# Patient Record
Sex: MALE | Race: WHITE | NOT HISPANIC OR LATINO | Employment: UNEMPLOYED | URBAN - METROPOLITAN AREA
[De-identification: names, ages, dates, MRNs, and addresses within clinical notes are randomized per-mention and may not be internally consistent; named-entity substitution may affect disease eponyms.]

---

## 2017-01-10 ENCOUNTER — GENERIC CONVERSION - ENCOUNTER (OUTPATIENT)
Dept: OTHER | Facility: OTHER | Age: 52
End: 2017-01-10

## 2017-07-19 ENCOUNTER — ALLSCRIPTS OFFICE VISIT (OUTPATIENT)
Dept: OTHER | Facility: OTHER | Age: 52
End: 2017-07-19

## 2017-07-19 DIAGNOSIS — F52.21 MALE ERECTILE DISORDER (CODE): ICD-10-CM

## 2017-07-19 DIAGNOSIS — N40.1 ENLARGED PROSTATE WITH LOWER URINARY TRACT SYMPTOMS (LUTS): ICD-10-CM

## 2017-07-19 LAB
CLARITY UR: NORMAL
COLOR UR: CLEAR
GLUCOSE (HISTORICAL): NORMAL
HGB UR QL STRIP.AUTO: NORMAL
KETONES UR STRIP-MCNC: NORMAL MG/DL
LEUKOCYTE ESTERASE UR QL STRIP: NORMAL
NITRITE UR QL STRIP: NORMAL
PH UR STRIP.AUTO: 5 [PH]
PROT UR STRIP-MCNC: NORMAL MG/DL
SP GR UR STRIP.AUTO: 1

## 2017-09-05 ENCOUNTER — GENERIC CONVERSION - ENCOUNTER (OUTPATIENT)
Dept: OTHER | Facility: OTHER | Age: 52
End: 2017-09-05

## 2017-12-04 ENCOUNTER — ALLSCRIPTS OFFICE VISIT (OUTPATIENT)
Dept: OTHER | Facility: OTHER | Age: 52
End: 2017-12-04

## 2017-12-05 ENCOUNTER — APPOINTMENT (OUTPATIENT)
Dept: LAB | Facility: HOSPITAL | Age: 52
End: 2017-12-05
Payer: COMMERCIAL

## 2017-12-05 DIAGNOSIS — F52.21 MALE ERECTILE DISORDER (CODE): ICD-10-CM

## 2017-12-05 DIAGNOSIS — D12.6 BENIGN NEOPLASM OF COLON: ICD-10-CM

## 2017-12-05 DIAGNOSIS — J30.9 ALLERGIC RHINITIS: ICD-10-CM

## 2017-12-05 DIAGNOSIS — Z13.220 ENCOUNTER FOR SCREENING FOR LIPOID DISORDERS: ICD-10-CM

## 2017-12-05 DIAGNOSIS — H65.90 NONSUPPURATIVE OTITIS MEDIA: ICD-10-CM

## 2017-12-05 DIAGNOSIS — N40.1 ENLARGED PROSTATE WITH LOWER URINARY TRACT SYMPTOMS (LUTS): ICD-10-CM

## 2017-12-05 DIAGNOSIS — H69.80 OTHER SPECIFIED DISORDERS OF EUSTACHIAN TUBE, UNSPECIFIED EAR: ICD-10-CM

## 2017-12-05 DIAGNOSIS — R03.0 ELEVATED BLOOD PRESSURE READING WITHOUT DIAGNOSIS OF HYPERTENSION: ICD-10-CM

## 2017-12-05 DIAGNOSIS — E66.9 OBESITY: ICD-10-CM

## 2017-12-05 LAB
ALBUMIN SERPL BCP-MCNC: 4 G/DL (ref 3.5–5)
ALP SERPL-CCNC: 75 U/L (ref 46–116)
ALT SERPL W P-5'-P-CCNC: 37 U/L (ref 12–78)
ANION GAP SERPL CALCULATED.3IONS-SCNC: 6 MMOL/L (ref 4–13)
AST SERPL W P-5'-P-CCNC: 16 U/L (ref 5–45)
BILIRUB SERPL-MCNC: 0.66 MG/DL (ref 0.2–1)
BILIRUB UR QL STRIP: NEGATIVE
BUN SERPL-MCNC: 18 MG/DL (ref 5–25)
CALCIUM SERPL-MCNC: 9.3 MG/DL (ref 8.3–10.1)
CHLORIDE SERPL-SCNC: 101 MMOL/L (ref 100–108)
CHOLEST SERPL-MCNC: 168 MG/DL (ref 50–200)
CLARITY UR: CLEAR
CO2 SERPL-SCNC: 29 MMOL/L (ref 21–32)
COLOR UR: YELLOW
CREAT SERPL-MCNC: 1.06 MG/DL (ref 0.6–1.3)
ERYTHROCYTE [DISTWIDTH] IN BLOOD BY AUTOMATED COUNT: 13.4 % (ref 11.6–15.1)
GFR SERPL CREATININE-BSD FRML MDRD: 81 ML/MIN/1.73SQ M
GLUCOSE P FAST SERPL-MCNC: 98 MG/DL (ref 65–99)
GLUCOSE UR STRIP-MCNC: NEGATIVE MG/DL
HCT VFR BLD AUTO: 44.2 % (ref 36.5–49.3)
HDLC SERPL-MCNC: 66 MG/DL (ref 40–60)
HGB BLD-MCNC: 15.4 G/DL (ref 12–17)
HGB UR QL STRIP.AUTO: NEGATIVE
KETONES UR STRIP-MCNC: NEGATIVE MG/DL
LDLC SERPL CALC-MCNC: 79 MG/DL (ref 0–100)
LEUKOCYTE ESTERASE UR QL STRIP: NEGATIVE
MCH RBC QN AUTO: 32.6 PG (ref 26.8–34.3)
MCHC RBC AUTO-ENTMCNC: 34.8 G/DL (ref 31.4–37.4)
MCV RBC AUTO: 94 FL (ref 82–98)
NITRITE UR QL STRIP: NEGATIVE
PH UR STRIP.AUTO: 6 [PH] (ref 4.5–8)
PLATELET # BLD AUTO: 191 THOUSANDS/UL (ref 149–390)
PMV BLD AUTO: 12 FL (ref 8.9–12.7)
POTASSIUM SERPL-SCNC: 4.2 MMOL/L (ref 3.5–5.3)
PROT SERPL-MCNC: 8 G/DL (ref 6.4–8.2)
PROT UR STRIP-MCNC: NEGATIVE MG/DL
PSA SERPL-MCNC: 0.3 NG/ML (ref 0–4)
RBC # BLD AUTO: 4.72 MILLION/UL (ref 3.88–5.62)
SODIUM SERPL-SCNC: 136 MMOL/L (ref 136–145)
SP GR UR STRIP.AUTO: 1.02 (ref 1–1.03)
TRIGL SERPL-MCNC: 114 MG/DL
UROBILINOGEN UR QL STRIP.AUTO: 0.2 E.U./DL
WBC # BLD AUTO: 6.49 THOUSAND/UL (ref 4.31–10.16)

## 2017-12-05 PROCEDURE — 85027 COMPLETE CBC AUTOMATED: CPT

## 2017-12-05 PROCEDURE — 80053 COMPREHEN METABOLIC PANEL: CPT

## 2017-12-05 PROCEDURE — 36415 COLL VENOUS BLD VENIPUNCTURE: CPT

## 2017-12-05 PROCEDURE — 84153 ASSAY OF PSA TOTAL: CPT

## 2017-12-05 PROCEDURE — 80061 LIPID PANEL: CPT

## 2017-12-05 PROCEDURE — 81003 URINALYSIS AUTO W/O SCOPE: CPT

## 2017-12-05 NOTE — PROGRESS NOTES
Assessment    1  Allergic rhinitis (477 9) (J30 9)   2  Eustachian tube dysfunction (381 81) (H69 80)   3  Serous otitis media (381 4) (H65 90)   4  BPH with obstruction/lower urinary tract symptoms (600 01,599 69) (N40 1,N13 8)   5  Elevated blood pressure reading (796 2) (R03 0)   6  Adenomatosis (211 3) (D12 6)   · Dr Bebe Mora, 12/12/13  Recommended colonoscopy in 10 years   7  Obesity (278 00) (E66 9)   8  Screening for lipid disorders (V77 91) (Z13 220)   9  Erectile dysfunction of non-organic origin (302 72) (F52 21)    Plan  Adenomatosis, Allergic rhinitis, BPH with obstruction/lower urinary tract symptoms,Elevated blood pressure reading, Erectile dysfunction of non-organic origin, Eustachiantube dysfunction, Obesity, Screening for lipid disorders, Serous otitis media    · Begin or continue regular aerobic exercise  Gradually work up to at least {count1}sessions of {dur1} of exercise a week ; Status:Complete;   Done: 21RQS3835 02:09PM   · Continue with our present treatment plan ; Status:Complete;   Done: 52Jrp461232:09PM   · We recommend that you bring your body mass index down to 26 ; Status:Complete;  Done: 59GXW3355 02:09PM   · Follow-up visit in 2 weeks Evaluation and Treatment  Follow-up  Status: Hold For -Scheduling  Requested for: 95FGA0296   · (1) CBC/ PLT (NO DIFF); Status:Active; Requested for:38Pwd5354;    · (1) COMPREHENSIVE METABOLIC PANEL; Status:Active; Requested for:61Jvb2739;    · (1) LIPID PANEL, FASTING; Status:Active; Requested for:69Dst2684;    · (1) OCCULT BLOOD, FECAL IMMUNOCHEMICAL TEST; Status:Active; Requestedfor:22Hpx9941;    · (1) PSA, DIAGNOSTIC (FOLLOW-UP); Status:Active; Requested for:86Hgt9250;    · (1) URINALYSIS (will reflex a microscopy if leukocytes, occult blood, protein or nitritesare not within normal limits); Status:Active; Requested for:66Ifu5309;    Allergic rhinitis, Eustachian tube dysfunction, Serous otitis media    · Azelastine HCl - 0 1 % Nasal Solution; USE 2 SPRAYS IN EACH       NOSTRILTWICE DAILY    Discussion/Summary    1  Allergic rhinitis/etd/serous otitis media, Astelin nasal spray was prescribedColon polyp status post colonoscopy 2013 per Gastroenterology repeat in 10 years, Hemoccult was orderedBPH/nocturia/ED, patient to follow up with Urology, PSA is orderedBorderline blood pressure recheck in 2 weeksObesity patient gained 3 lb diet exercise and weight loss recommendedHealth maintenance will check full blood work lipid panelRTO 2 weeks  Possible side effects of new medications were reviewed with the patient/guardian today  The treatment plan was reviewed with the patient/guardian  The patient/guardian understands and agrees with the treatment plan     Self Referrals: No      Chief Complaint  patient c/o blocked ears left more than right, some pain, nasal congestion x 1-2 weeks  Patient is not taking any NSAIDS, etc  ak      History of Present Illness  HPI: History as above  Years just feel âstrangeâ negative otalgia or otorrhea mild head congestion no fever chills  Review of Systems   Constitutional: as noted in HPI   ENT: as noted in HPI  Cardiovascular: no complaints of slow or fast heart rate, no chest pain, no palpitations, no leg claudication or lower extremity edema  Respiratory: no complaints of shortness of breath, no wheezing or cough, no dyspnea on exertion, no orthopnea or PND  Gastrointestinal: no complaints of abdominal pain, no constipation, no nausea or vomiting, no diarrhea or bloody stools  Genitourinary: no complaints of dysuria or incontinence, no hesitancy, no nocturia, no genital lesion, no inadequacy of penile erection  Musculoskeletal: no complaints of arthralgia, no myalgia, no joint swelling or stiffness, no limb pain or swelling  Integumentary: no complaints of skin rash or lesion, no itching or dry skin, no skin wounds    Neurological: no complaints of headache, no confusion, no numbness or tingling, no dizziness or fainting  Active Problems  1  Adenomatosis (211 3) (D12 6)   2  Allergic rhinitis (477 9) (J30 9)   3  BPH with obstruction/lower urinary tract symptoms (600 01,599 69) (N40 1,N13 8)   4  Elevated blood pressure reading (796 2) (R03 0)   5  Erectile dysfunction of non-organic origin (302 72) (F52 21)   6  External hemorrhoid (455 3) (K64 4)   7  Nocturia (788 43) (R35 1)   8  Obesity (278 00) (E66 9)   9  Organic impotence (607 84) (N52 9)   10  Screening for lipid disorders (V77 91) (Z13 220)   11  Sleep apnea (780 57) (G47 30)   12  Stress (V62 89) (F43 9)   13  Testicular asymmetry (752 89) (Q55 9)    Past Medical History  1  History of backache (V13 59) (Z87 39)  Active Problems And Past Medical History Reviewed: The active problems and past medical history were reviewed and updated today  Family History  Mother    1  Family history of Emphysema   2  Family history of Family Health Status Of Mother -   Father    3  Family history of Family Health Status Of Father - Alive  Family History Reviewed: The family history was reviewed and updated today  Social History   · Being A Social Drinker   · Does not use illicit drugs (F30 22) (Z78 9)   ·    · Never a smoker   · No secondhand smoke exposure (V49 89) (Z78 9)  The social history was reviewed and updated today  Surgical History    1  History of Complete Colonoscopy  Surgical History Reviewed: The surgical history was reviewed and updated today  Current Meds   1  Sildenafil Citrate 20 MG Oral Tablet; TAKE 2 TABLET Daily PRN; Therapy: 57SRL6587 to (Evaluate:01Ojy2487)  Requested for: 24MRS1544; Last Rx:87Hht1230 Ordered    The medication list was reviewed and updated today  Allergies  1   No Known Drug Allergies    Vitals   Recorded: 37SKD9953 01:58PM   Temperature 98 3 F   Heart Rate 76   Systolic 669   Diastolic 82   Weight 593 lb    BMI Calculated 33 15   BSA Calculated 2 22       Physical Exam   Constitutional General appearance: No acute distress, well appearing and well nourished  Eyes  Conjunctiva and lids: No swelling, erythema, or discharge  Ears, Nose, Mouth, and Throat  External inspection of ears and nose: Normal    Otoscopic examination: Abnormal  -- Both tympanic membranes dull with fluid negative injection  Nasal mucosa, septum, and turbinates: Abnormal  -- Positive allergic turbinates  Oropharynx: Normal with no erythema, edema, exudate or lesions  Pulmonary  Respiratory effort: No increased work of breathing or signs of respiratory distress  Auscultation of lungs: Clear to auscultation, equal breath sounds bilaterally, no wheezes, no rales, no rhonci  Cardiovascular  Palpation of heart: Normal PMI, no thrills  Auscultation of heart: Normal rate and rhythm, normal S1 and S2, without murmurs  Examination of extremities for edema and/or varicosities: Normal    Carotid pulses: Normal    Abdomen Soft nontender  Lymphatic  Palpation of lymph nodes in neck: No lymphadenopathy  Musculoskeletal  Gait and station: Normal    Skin Warm and dry  Neurologic Negative gross focal motor deficit  Psychiatric  Mood and affect: Normal          Future Appointments    Date/Time Provider Specialty Site   07/18/2018 02:00 PM KAI Healy   Urology [facility]       Signatures   Electronically signed by : Debby Hernandez DO; Dec  4 2017  2:11PM EST                       (Author)

## 2017-12-07 ENCOUNTER — GENERIC CONVERSION - ENCOUNTER (OUTPATIENT)
Dept: OTHER | Facility: OTHER | Age: 52
End: 2017-12-07

## 2018-01-10 NOTE — RESULT NOTES
Message   Recorded as Task   Date: 04/08/2016 11:32 AM, Created By: Robert Molina   Task Name: Call Patient with results   Assigned To: Warren Ervin   Regarding Patient: Dedrick Fang, Status:  In Progress   Comment:    Warren Ervin - 08 Apr 2016 11:32 AM     Patient Phone: [phone]    chest x-ray is clear   Aixa Wilalrd - 08 Apr 2016 12:00 PM     TASK IN PROGRESS   Aixa Willard - 08 Apr 2016 12:07 PM     TASK EDITED  [facility] H# informing pt of CXR results     Signatures   Electronically signed by : Barbara Renee, ; Apr 8 2016 12:07PM EST                       (Author)

## 2018-01-10 NOTE — RESULT NOTES
Verified Results  * US ABDOMEN COMPLETE 53VDV4427 02:02PM Cecily Hernandez Order Number: [id number]     Test Name Result Flag Reference   US ABDOMEN COMPLETE (Report)     ABDOMEN ULTRASOUND, COMPLETE     INDICATION: Upper abdominal pain  COMPARISON: 01/10/2014     TECHNIQUE:  Real-time ultrasound of the abdomen was performed with a curvilinear transducer with both volumetric sweeps and still imaging techniques  FINDINGS:     PANCREAS: Visualized portions of the pancreas are within normal limits  Somewhat obscured by overlying bowel gas  AORTA AND IVC: Visualized portions are normal for patient age  LIVER:   Size: Within normal range  The liver measures 15 7 cm in the midclavicular line  Contour: Surface contour is smooth  Parenchyma: Echogenicity and echotexture are within normal limits  No evidence of suspicious mass  The main portal vein is patent and hepatopetal       BILIARY:   The gallbladder is normal in caliber  No wall thickening or pericholecystic fluid  No stones or sludge identified  Sonographic Dierdre Gerold sign is negative  No intrahepatic biliary dilatation  CBD measures 3 mm  No choledocholithiasis  KIDNEY:    Right kidney measures 11 9 x 4 5 cm  The renal cortex measures 1 3 cm  Within normal limits  Left kidney measures 12 7 x 6 7 cm  The renal cortex measures 1 7 cm  Within normal limits  SPLEEN:    Measures 12 6 x 12 3 x 5 2 cm  Within normal limits  ASCITES: None         IMPRESSION:     Normal        Workstation performed: [id number]     Signed by:   Dana Alcala MD   4/8/16

## 2018-01-11 NOTE — RESULT NOTES
Verified Results  ECHO STRESS TEST W CONTRAST IF INDICATED 2016 07:49AM Warren Ervin     Test Name Result Flag Reference   ECHO STRESS TEST W CONTRAST IF INDICATED (Report)     [address]   [facility], [address] [facility]   [phone]     Exercise Stress Echocardiography     Study date: 2016     Patient: Aniceto Red   MR number: [id number]   Account number: [de-identified]   : 1965   Age: 54 years   Gender: Male   Study date: 2016   Status: Outpatient   Location: Stress lab   Height: 70 in   Weight: 236 lb   BP: 138// 86 mmHg     Indications: Detection of coronary artery disease  Diagnosis: R07 9 - Chest pain, unspecified     Sonographer: Paul Villa [facility] AE-PE   Primary Physician: Roe Loza DO   Referring Physician: Roe Loza DO   Group: [id number]   Cardiology Fellow: Ashley Mariee MD   RN: Javier Paris RN BSN   Report Prepared By: Jorge Gupta   EKG Technician: Jorge Gupta   Interpreting Physician: Peter Roberson MD     IMPRESSIONS:   Normal study after maximal exercise without reproduction of symptoms  Left   ventricular systolic function was normal      SUMMARY     STRESS RESULTS:   Duration of exercise was 12 min and 2 sec  Maximal work rate was 13 7 METs  Maximal heart rate during stress was 153 bpm ( 90 % of maximal predicted heart   rate)  Target heart rate was achieved  There was resting hypertension with an appropriate blood pressure response to   stress  There was no chest pain during stress  ECG CONCLUSIONS:   The stress ECG was negative for ischemia  ECHO CONCLUSIONS:   There was no echocardiographic evidence for stress-induced ischemia  HISTORY: The patient is a 54year old  male  Chest pain status: no   chest pain  Other symptoms: decreased effort tolerance, and fatigue  Coronary   artery disease risk factors: none   Co-morbidity: obesity  REST ECG: Normal sinus rhythm  PROCEDURE: The study was performed in the stress lab  Treadmill exercise   testing was performed, using the Latrice protocol  Stress and rest   echocardiographic evaluation was performed from multiple acoustic windows for   evaluation of ventricular function  LATRICE PROTOCOL:   HR bpm SBP mmHg DBP mmHg Symptoms   Baseline 73 138 86 none   Stage 1 104 144 80 none   Stage 2 121 152 80 none   Stage 3 133 168 80 none   Stage 4 153 -- -- mild fatigue   Immediate 153 -- -- mild fatigue   Recovery 1 109 180 78 mild fatigue   Recovery 2 96 158 82 subsiding     MEDICATIONS GIVEN: No medications or fluids given  STRESS RESULTS: Duration of exercise was 12 min and 2 sec  The patient   exercised to protocol stage 4  Maximal work rate was 13 7 METs  Maximal heart   rate during stress was 153 bpm ( 90 % of maximal predicted heart rate)  Target   heart rate was achieved  The heart rate response to stress was normal  Maximal   systolic blood pressure during stress was 180 mmHg  There was resting   hypertension with an appropriate blood pressure response to stress  There was   no chest pain during stress  The stress test was terminated due to mild fatigue  ECG CONCLUSIONS: The stress ECG was negative for ischemia  Arrhythmia during   stress: isolated atrial premature beats  STRESS 2D ECHO RESULTS:     BASELINE: Left ventricular size was normal  Overall left ventricular systolic   function was normal      PEAK STRESS: There was an appropriate reduction in left ventricular size  There   was an appropriate augmentation in LV function  OTHER ECHO FINDINGS: There was trace mitral, tricuspid and aortic regurgitation   noted on the baseline echocardiogram      ECHO CONCLUSIONS: There was no echocardiographic evidence for stress-induced   ischemia  The image quality was fair       Prepared and electronically signed by     Lauren Sheldon MD   Signed 19-Apr-2016 10:38:19

## 2018-01-13 VITALS
SYSTOLIC BLOOD PRESSURE: 144 MMHG | HEART RATE: 68 BPM | DIASTOLIC BLOOD PRESSURE: 102 MMHG | WEIGHT: 230.38 LBS | BODY MASS INDEX: 32.98 KG/M2 | HEIGHT: 70 IN

## 2018-01-13 NOTE — RESULT NOTES
Verified Results  [address]  27HYR8701 07:37AM Francenia Sallies Sherra Boast Order Number: [id number]     Test Name Result Flag Reference   US SCROTUM AND TESTICLES (Report)     SCROTAL ULTRASOUND     INDICATION: 53-year-old with left testicular swelling and right inguinal discomfort  COMPARISON: CT abdomen and pelvis 7/16/2013  TECHNIQUE:  Ultrasound the scrotal contents was performed with a high frequency linear transducer utilizing volumetric sweep imaging as well as standard still image techniques  Imaging performed in longitudinal and transverse orientation  Color and    spectral Doppler evaluation also performed bilaterally  FINDINGS:     TESTES:    Testes are symmetric and normal in size  RIGHT testis = 4 6 x 2 1 x 3 3 cm    Normal contour with homogeneous smooth echotexture  No intratesticular mass lesion or calcifications  LEFT testis = 3 8 x 2 4 x 3 8 cm   Normal contour with homogeneous smooth echotexture  No intratesticular mass lesion or calcifications  Doppler flow within both testes is present and symmetric  EPIDIDYMIDES:    Normal Size  Doppler ultrasound demonstrates normal blood flow  There are small epididymal cyst(s) in the left epididymis  Otherwise unremarkable  Right epididymal appendage is noted  HYDROCELE: No significant fluid present  VARICOCELE: Small right varicocele is present  SCROTUM: Scrotal thickness and appearance within normal limits  No evidence for extratesticular mass or hernia demonstrated  IMPRESSION:        1  Normal testes  2  Small right varicocele     3  Small left epididymal cyst         Workstation performed: [id number]     Signed by:   Poppy Seaman MD   5/6/16

## 2018-01-17 ENCOUNTER — ALLSCRIPTS OFFICE VISIT (OUTPATIENT)
Dept: OTHER | Facility: OTHER | Age: 53
End: 2018-01-17

## 2018-01-18 NOTE — RESULT NOTES
Verified Results  * XR CHEST PA & LATERAL 07Apr2016 02:07PM Carolyn Lares Order Number: [id number]     Test Name Result Flag Reference   XR CHEST PA & LATERAL (Report)     CHEST      INDICATION: Physical exam     COMPARISON: January 10, 2014     VIEWS: Frontal and lateral projections; 2 images     FINDINGS:        Cardiomediastinal silhouette appears unremarkable  The lungs are clear  No pneumothorax or pleural effusion  Osseous structures are stable  There is again noted slight loss of anterior vertebral body height at the T12 T11 T10 levels stable       IMPRESSION:     No active pulmonary disease         Workstation performed: [id number]     Signed by:   Yulia Leung MD   4/8/16

## 2018-01-18 NOTE — PROGRESS NOTES
Assessment    1  Chest pain (786 50) (R07 9)   2  Abdominal pain, epigastric (789 06) (R10 13)   3  Iron deficiency anemia (280 9) (D50 9)   · Status post EGD and colonoscopy Dr Brisa Titus 12/12/13   4  Erectile dysfunction of non-organic origin (302 72) (F52 21)   5  BPH with obstruction/lower urinary tract symptoms (600 01,599 69) (N40 1,N13 8)   6  Obesity (278 00) (E66 9)   7  Allergic rhinitis (477 9) (J30 9)   8  Encounter for preventive health examination (V70 0) (Z00 00)   9  Adenomatosis (211 3) (D12 6)   · Dr Sophia Ramos, 12/12/13  Recommended colonoscopy in 10 years    Plan  Abdominal pain, epigastric, Adenomatosis, Allergic rhinitis, BPH with obstruction/lower  urinary tract symptoms, Chest pain, Health Maintenance, Erectile dysfunction of  non-organic origin, Iron deficiency anemia, Obesity    · Continue with our present treatment plan ; Status:Complete;   Done: 70GLO3760   · Follow-up visit in 3 weeks Evaluation and Treatment  Follow-up  Status: Hold For -  Scheduling  Requested for: 31MYX6164   · (1) AMYLASE; Status:Active; Requested for:04Apr2016;    · (1) CBC/ PLT (NO DIFF); Status:Active; Requested for:04Apr2016;    · (1) COMPREHENSIVE METABOLIC PANEL; Status:Active; Requested for:04Apr2016;    · (1) LIPASE; Status:Active; Requested for:04Apr2016;    · (1) LIPID PANEL, FASTING; Status:Active; Requested for:04Apr2016;    · (1) PSA, DIAGNOSTIC (FOLLOW-UP); Status:Active; Requested for:04Apr2016;    · (1) TSH; Status:Active; Requested for:04Apr2016;    · (1) URINALYSIS (will reflex a microscopy if leukocytes, occult blood, protein or nitrites  are not within normal limits); Status:Active; Requested for:04Apr2016;    · EKG/ECG- POC; Status:Active; Requested for:31Mar2016; Abdominal pain, epigastric, Chest pain    · * US ABDOMEN COMPLETE; Status:Hold For - Scheduling; Requested for:31Mar2016;    · * XR CHEST PA & LATERAL; Status:Active;  Requested for:31Mar2016;   Adenomatosis    · COLONOSCOPY; Status:Complete;   Done: 00PNF4094 02:58PM  Chest pain    · ECHO STRESS TEST W CONTRAST IF INDICATED; Status:Need Information -  Financial Authorization; Requested for:31Mar2016;   Erectile dysfunction of non-organic origin    · Cialis 20 MG Oral Tablet; Take as directed    Discussion/Summary    #1  Health maintenance, exam was completed, patient declined the depression questionnaire,PHQ-9   #2  Chest pain, EKG was done EKG is normal without change, will check stress echocardiogram and chest x-ray  #3  Epigastric abdominal pain, patient had a full GI workup in 2013  Including EGD and colonoscopy  We'll check blood work and ultrasound of abdomen  Patient to use probiotics return if still symptomatic on recheck will refer back to patient's gastroenterologist  #4  ED, refill of Cialis was given  However, I recommending holding off on taking this until results of stress echocardiogram are known  #5  Obesity, patient has lost weight  #6  History of iron deficiency anemia, check blood work  #7  BPH, PSA is ordered  #8  Will follow-up in 3 weeks, sooner if needed  Possible side effects of new medications were reviewed with the patient/guardian today  Chief Complaint  yearly physical - states he is not feeling up to [facility]      History of Present Illness  HPI: Patient started working out is losing weight  Patient does have some lower chest discomfort upper abdominal discomfort which comes and goes on its own with activity  No shortness of breath nausea vomiting palpitations or diaphoresis  Patient states this is the same symptomatology had a couple years ago when he went to GI  Review of Systems    Constitutional: Patient lost 8 pounds since last office visit  Eyes: No complaints of eye pain, no red eyes, no discharge from eyes, no itchy eyes  ENT: no complaints of earache, no hearing loss, no nosebleeds, no nasal discharge, no sore throat, no hoarseness  Cardiovascular: as noted in HPI     Respiratory: No complaints of shortness of breath, no wheezing, no cough, no SOB on exertion, no orthopnea or PND  Gastrointestinal: as noted in HPI  Genitourinary: No complaints of dysuria, no incontinence, no hesitancy, no nocturia, no genital lesion, no testicular pain  Musculoskeletal: No complaints of arthralgia, no myalgias, no joint swelling or stiffness, no limb pain or swelling  Integumentary: No complaints of skin rash or skin lesions, no itching, no skin wound, no dry skin  Neurological: No compliants of headache, no confusion, no convulsions, no numbness or tingling, no dizziness or fainting, no limb weakness, no difficulty walking  Psychiatric: Is not suicidal, no sleep disturbances, no anxiety or depression, no change in personality, no emotional problems  Endocrine: No complaints of proptosis, no hot flashes, no muscle weakness, no erectile dysfunction, no deepening of the voice, no feelings of weakness  Hematologic/Lymphatic: No complaints of swollen glands, no swollen glands in the neck, does not bleed easily, no easy bruising  Active Problems    1  Adenomatosis (211 3) (D12 6)   2  Allergic rhinitis (477 9) (J30 9)   3  BPH with obstruction/lower urinary tract symptoms (600 01,599 69) (N40 1,N13 8)   4  Erectile dysfunction of non-organic origin (302 72) (F52 21)   5  Iron deficiency anemia (280 9) (D50 9)   6  Nocturia (788 43) (R35 1)   7  Obesity (278 00) (E66 9)   8  Sleep apnea (780 57) (G47 30)    Past Medical History    · History of backache (V13 59) (Z87 39)    Family History    · Family history of Emphysema   · Family history of Family Health Status Of Mother -     · Family history of Family Health Status Of Father - Alive    Social History    · Being A Social Drinker   · Never A Smoker    Current Meds   1  Cialis 20 MG Oral Tablet; Take one half to one tablet one hour before activity; Therapy: 15EYB0468 to (Last Rx:73Gye2196) Ordered   2   Multiple Vitamins/Iron Oral Test Name Result Flag Reference   Colonoscopy 12/12/13         Signatures   Electronically signed by : Hai Beatty DO; Mar 31 2016  3:33PM EST                       (Author)

## 2018-01-18 NOTE — PROGRESS NOTES
Assessment   1  Current severe episode of major depressive disorder without psychotic features without     prior episode (296 23) (F32 2)   2  BPH with obstruction/lower urinary tract symptoms (600 01,599 69) (N40 1,N13 8)   3  Elevated blood pressure reading (796 2) (R03 0)    Plan   Current severe episode of major depressive disorder without psychotic features without    prior episode    · Escitalopram Oxalate 10 MG Oral Tablet (Lexapro); Take 1 tablet daily  Health Maintenance    · Influenza    Discussion/Summary      1 : Current episode of major depression: Patient does have a new diagnosis of depression with this  He is negative SI, positive contract, however the PHQ-14 does clearly show moderate to severe depression  Recommend treatment with Lexapro 10 mg  Follow-up in approximately 2-4 weeks  Of note, he did have blood work done in December which was quite reasonable  BPH: Patient is seen Urology in the past  PSA was done in December  No changes needed at the moment  Elevated blood pressure: Blood pressure is doing quite well today  In fact it is normal  Follow-up in 6 months  Chief Complaint   Pt c/o recent stress and possible depression  Pt states he is going through a Divorce and just needs some guidance  kw      History of Present Illness   HPI: He is a bit bothered recently  He is not working, and is seperated from his wife  came in to [facility] for this  He was asked to come in by his wife  has a significant amount of financial issues  is living with his brother  is working out, trying to stay focused and sharp  SI, pos contract  Review of Systems        Constitutional: no fever or chills, feels well, no tiredness, no recent weight loss or weight gain  ENT: no complaints of earache, no loss of hearing, no nosebleeds or nasal discharge, no sore throat or hoarseness        Cardiovascular: no complaints of slow or fast heart rate, no chest pain, no palpitations, no leg claudication or lower extremity edema  Respiratory: no complaints of shortness of breath, no wheezing or cough, no dyspnea on exertion, no orthopnea or PND  Gastrointestinal: no complaints of abdominal pain, no constipation, no nausea or vomiting, no diarrhea or bloody stools  Genitourinary: no complaints of dysuria or incontinence, no hesitancy, no nocturia, no genital lesion, no inadequacy of penile erection  Musculoskeletal: no complaints of arthralgia, no myalgia, no joint swelling or stiffness, no limb pain or swelling  Integumentary: no complaints of skin rash or lesion, no itching or dry skin, no skin wounds  Neurological: no complaints of headache, no confusion, no numbness or tingling, no dizziness or fainting  Other Symptoms: Psych Per HPI  Active Problems   1  Adenomatosis (211 3) (D12 6)   2  Allergic rhinitis (477 9) (J30 9)   3  BPH with obstruction/lower urinary tract symptoms (600 01,599 69) (N40 1,N13 8)   4  Elevated blood pressure reading (796 2) (R03 0)   5  Erectile dysfunction of non-organic origin (302 72) (F52 21)   6  Eustachian tube dysfunction (381 81) (H69 80)   7  External hemorrhoid (455 3) (K64 4)   8  Nocturia (788 43) (R35 1)   9  Obesity (278 00) (E66 9)   10  Organic impotence (607 84) (N52 9)   11  Screening for lipid disorders (V77 91) (Z13 220)   12  Serous otitis media (381 4) (H65 90)   13  Sleep apnea (780 57) (G47 30)   14  Stress (V62 89) (F43 9)   15  Testicular asymmetry (752 89) (Q55 9)    Past Medical History   1  History of backache (V13 59) (Z87 39)  Active Problems And Past Medical History Reviewed: The active problems and past medical history were reviewed and updated today  Family History   Mother    1  Family history of Emphysema   2  Family history of Family Health Status Of Mother -   Father    3  Family history of Family Health Status Of Father - Alive  Family History Reviewed:     The family history was reviewed and updated today  Social History    · Being A Social Drinker   · Does not use illicit drugs (Z38 87) (Z78 9)   ·    · Never a smoker   · No secondhand smoke exposure (V49 89) (Z78 9)  The social history was reviewed and updated today  The social history was reviewed and is unchanged  Surgical History   1  History of Complete Colonoscopy  Surgical History Reviewed: The surgical history was reviewed and updated today  Current Meds    1  Sildenafil Citrate 20 MG Oral Tablet; TAKE 2 TABLET Daily PRN; Therapy: [id number] to (Evaluate:[id number])  Requested for: [id number]; Last     Rx:[id number] Ordered     The medication list was reviewed and updated today  Allergies   1  No Known Drug Allergies    Vitals    Recorded: [id number] 01:12PM   Heart Rate 60   Respiration 16   Systolic 215   Diastolic 78   Height 5 ft 10 in   Weight 233 lb 6 oz   BMI Calculated 33 49   BSA Calculated 2 23     Physical Exam        Constitutional      General appearance: No acute distress, well appearing and well nourished  Pulmonary      Respiratory effort: No increased work of breathing or signs of respiratory distress  Auscultation of lungs: Clear to auscultation, equal breath sounds bilaterally, no wheezes, no rales, no rhonci  Cardiovascular      Auscultation of heart: Normal rate and rhythm, normal S1 and S2, without murmurs  Results/Data   PHQ-9 Adult Depression Screening [id number] 01:46PM Rl Robertson      Test Name Result Flag Reference   PHQ-9 Adult Depression Score 14     Over the last two weeks, how often have you been bothered by any of the following problems?      Little interest or pleasure in doing things: More than half the days - 2     Feeling down, depressed, or hopeless: More than half the days - 2     Trouble falling or staying asleep, or sleeping too much: Several days - 1     Feeling tired or having little energy: Several days - 1     Poor appetite or over eating: Not at all - 0     Feeling bad about yourself - or that you are a failure or have let yourself or your family down: Nearly every day - 3     Trouble concentrating on things, such as reading the newspaper or watching television: Nearly every day - 3     Moving or speaking so slowly that other people could have noticed  Or the opposite -  being so fidgety or restless that you have been moving around a lot more than usual: Several days - 1     Thoughts that you would be better off dead, or of hurting yourself in some way: Several days - 1   PHQ-9 Adult Depression Screening Positive     PHQ-9 Difficulty Level Very difficult     PHQ-9 Severity Moderate Depression          Future Appointments      Date/Time Provider Specialty Site   07/18/2018 02:00 PM KAI Pate  [facility]     Signatures    Electronically signed by :  KAI Camargo ; Jan 17 2018  2:00PM EST                       (Author)

## 2018-01-22 VITALS
HEART RATE: 68 BPM | DIASTOLIC BLOOD PRESSURE: 66 MMHG | HEIGHT: 70 IN | WEIGHT: 228.38 LBS | BODY MASS INDEX: 32.69 KG/M2 | SYSTOLIC BLOOD PRESSURE: 104 MMHG

## 2018-01-22 VITALS
HEART RATE: 76 BPM | WEIGHT: 231 LBS | BODY MASS INDEX: 33.14 KG/M2 | DIASTOLIC BLOOD PRESSURE: 82 MMHG | TEMPERATURE: 98.3 F | SYSTOLIC BLOOD PRESSURE: 140 MMHG

## 2018-01-22 VITALS
SYSTOLIC BLOOD PRESSURE: 120 MMHG | DIASTOLIC BLOOD PRESSURE: 78 MMHG | WEIGHT: 233.38 LBS | RESPIRATION RATE: 16 BRPM | HEIGHT: 70 IN | HEART RATE: 60 BPM | BODY MASS INDEX: 33.41 KG/M2

## 2018-01-23 NOTE — RESULT NOTES
Verified Results  (1) CBC/ PLT (NO DIFF) [id number] 08:45AM SaloniabiWarren max     Test Name Result Flag Reference   HEMATOCRIT 44 2 %  36 5-49 3   HEMOGLOBIN 15 4 g/dL  12 0-17 0   MCHC 34 8 g/dL  31 4-37 4   MCH 32 6 pg  26 8-34 3   MCV 94 fL  82-98   PLATELET COUNT 310 Thousands/uL  149-390   RBC COUNT 4 72 Million/uL  3 88-5 62   RDW 13 4 %  11 6-15 1   WBC COUNT 6 49 Thousand/uL  4 31-10 16   MPV 12 0 fL  8 9-12 7     (1) COMPREHENSIVE METABOLIC PANEL [id number] 00:13BX Warren Ervin     Test Name Result Flag Reference   SODIUM 136 mmol/L  136-145   POTASSIUM 4 2 mmol/L  3 5-5 3   CHLORIDE 101 mmol/L  100-108   CARBON DIOXIDE 29 mmol/L  21-32   ANION GAP (CALC) 6 mmol/L  4-13   BLOOD UREA NITROGEN 18 mg/dL  5-25   CREATININE 1 06 mg/dL  0 60-1 30   Standardized to IDMS reference method   CALCIUM 9 3 mg/dL  8 3-10 1   BILI, TOTAL 0 66 mg/dL  0 20-1 00   ALK PHOSPHATAS 75 U/L     ALT (SGPT) 37 U/L  12-78   Specimen collection should occur prior to Sulfasalazine administration due to the potential for falsely depressed results  AST(SGOT) 16 U/L  5-45   Specimen collection should occur prior to Sulfasalazine administration due to the potential for falsely depressed results  ALBUMIN 4 0 g/dL  3 5-5 0   TOTAL PROTEIN 8 0 g/dL  6 4-8 2   eGFR 81 ml/min/1 73sq m     National Kidney Disease Education Program recommendations are as follows:  GFR calculation is accurate only with a steady state creatinine  Chronic Kidney disease less than 60 ml/min/1 73 sq  meters  Kidney failure less than 15 ml/min/1 73 sq  meters  GLUCOSE FASTING 98 mg/dL  65-99   Specimen collection should occur prior to Sulfasalazine administration due to the potential for falsely depressed results  Specimen collection should occur prior to Sulfapyridine administration due to the potential for falsely elevated results       (1) LIPID PANEL, FASTING [id number] 08:45AM Warren Ervin     Test Name Result Flag Reference CHOLESTEROL 168 mg/dL     HDL,DIRECT 66 mg/dL H 40-60   Specimen collection should occur prior to Metamizole administration due to the potential for falsley depressed results  LDL CHOLESTEROL CALCULATED 79 mg/dL  0-100   Triglyceride:        Normal <150 mg/dl   Borderline High 150-199 mg/dl   High 200-499 mg/dl   Very High >499 mg/dl      Cholesterol:       Desirable <200 mg/dl    Borderline High 200-239 mg/dl    High >239 mg/dl      HDL Cholesterol:       High>59 mg/dL    Low <41 mg/dL      This screening LDL is a calculated result  It does not have the accuracy of the Direct Measured LDL in the monitoring of patients with hyperlipidemia and/or statin therapy  Direct Measure LDL (MBP878) must be ordered separately in these patients  TRIGLYCERIDES 114 mg/dL  <=150   Specimen collection should occur prior to N-Acetylcysteine or Metamizole administration due to the potential for falsely depressed results  (1) URINALYSIS (will reflex a microscopy if leukocytes, occult blood, protein or nitrites are not within normal limits) [id number] 08:45AM Warren Ervin     Test Name Result Flag Reference   COLOR Yellow     CLARITY Clear     SPECIFIC GRAVITY UA 1 020  1 003-1 030   PH UA 6 0  4 5-8 0   LEUKOCYTE ESTERASE UA Negative  Negative   NITRITE UA Negative  Negative   PROTEIN UA Negative mg/dl  Negative   GLUCOSE UA Negative mg/dl  Negative   KETONES UA Negative mg/dl  Negative   UROBILINOGEN UA 0 2 E U /dl  0 2, 1 0 E U /dl   BILIRUBIN UA Negative  Negative   BLOOD UA Negative  Negative, Trace-Intact     (1) PSA, DIAGNOSTIC (FOLLOW-UP) [id number] 08:45AM Warren Ervin     Test Name Result Flag Reference   PSA 0 3 ng/mL  0 0-4 0   American Urological Association Guidelines define biochemical recurrence of prostate cancer as a detectable or rising PSA value post-radical prostatectomy that is greater than or equal to 0 2 ng/mL with a second confirmatory level of greater than or equal to 0 2 ng/mL

## 2018-02-01 ENCOUNTER — OFFICE VISIT (OUTPATIENT)
Dept: FAMILY MEDICINE CLINIC | Facility: CLINIC | Age: 53
End: 2018-02-01
Payer: COMMERCIAL

## 2018-02-01 VITALS
HEIGHT: 70 IN | DIASTOLIC BLOOD PRESSURE: 84 MMHG | WEIGHT: 230.8 LBS | HEART RATE: 80 BPM | BODY MASS INDEX: 33.04 KG/M2 | SYSTOLIC BLOOD PRESSURE: 128 MMHG

## 2018-02-01 DIAGNOSIS — H69.83 DYSFUNCTION OF BOTH EUSTACHIAN TUBES: ICD-10-CM

## 2018-02-01 DIAGNOSIS — F32.2 MAJOR DEPRESSIVE DISORDER, SINGLE EPISODE, SEVERE WITHOUT PSYCHOTIC FEATURES (HCC): ICD-10-CM

## 2018-02-01 DIAGNOSIS — R03.0 ELEVATED BLOOD PRESSURE READING: ICD-10-CM

## 2018-02-01 DIAGNOSIS — H66.93 OTITIS OF BOTH EARS: Primary | ICD-10-CM

## 2018-02-01 PROBLEM — H69.80 EUSTACHIAN TUBE DYSFUNCTION: Status: ACTIVE | Noted: 2017-12-04

## 2018-02-01 PROBLEM — H69.90 EUSTACHIAN TUBE DYSFUNCTION: Status: ACTIVE | Noted: 2017-12-04

## 2018-02-01 PROCEDURE — 99213 OFFICE O/P EST LOW 20 MIN: CPT | Performed by: FAMILY MEDICINE

## 2018-02-01 PROCEDURE — 92567 TYMPANOMETRY: CPT | Performed by: FAMILY MEDICINE

## 2018-02-01 RX ORDER — AMOXICILLIN 500 MG/1
500 CAPSULE ORAL EVERY 8 HOURS SCHEDULED
Qty: 30 CAPSULE | Refills: 0 | Status: SHIPPED | OUTPATIENT
Start: 2018-02-01 | End: 2018-02-11

## 2018-02-01 RX ORDER — ESCITALOPRAM OXALATE 10 MG/1
1 TABLET ORAL DAILY
COMMUNITY
Start: 2018-01-17 | End: 2018-03-29

## 2018-02-01 RX ORDER — SILDENAFIL CITRATE 20 MG/1
1 TABLET ORAL DAILY PRN
COMMUNITY
Start: 2016-06-13 | End: 2018-03-21 | Stop reason: SDUPTHER

## 2018-02-01 NOTE — PATIENT INSTRUCTIONS
Problem List Items Addressed This Visit     Major depressive disorder, single episode, severe without psychotic features ([address] )     Patient appears to be doing well with the Lexapro at 10 mg  There are not a lot of symptom changes, but he also did not wish to increase the medication at this point  He did have some changes in his current situation, so we will wait and see if we need to adjust the medication later on  As far as follow-up, 3 months would be sufficient  Relevant Medications    escitalopram (LEXAPRO) 10 mg tablet    Elevated blood pressure reading     Blood pressure today is normal          Eustachian tube dysfunction     Ear exam today bilaterally is normal, i e  no signs of eustachian tube dysfunction or infection, aside from his symptoms  Tympanogram did reveal otitis media, but did not showed eustachian tube dysfunction  Antibiotics for otitis prescribed  Relevant Orders    Tympanometry      Other Visit Diagnoses     Otitis of both ears    -  Primary    Recommend antibiotics  Follow-up in approximately 2 weeks if needed      Relevant Medications    amoxicillin (AMOXIL) 500 mg capsule    Other Relevant Orders    Tympanometry

## 2018-02-01 NOTE — ASSESSMENT & PLAN NOTE
Patient appears to be doing well with the Lexapro at 10 mg  There are not a lot of symptom changes, but he also did not wish to increase the medication at this point  He did have some changes in his current situation, so we will wait and see if we need to adjust the medication later on  As far as follow-up, 3 months would be sufficient

## 2018-02-01 NOTE — PROGRESS NOTES
Assessment/Plan:    Major depressive disorder, single episode, severe without psychotic features ([facility] )  Patient appears to be doing well with the Lexapro at 10 mg  There are not a lot of symptom changes, but he also did not wish to increase the medication at this point  He did have some changes in his current situation, so we will wait and see if we need to adjust the medication later on  As far as follow-up, 3 months would be sufficient  Elevated blood pressure reading  Blood pressure today is normal     Eustachian tube dysfunction  Ear exam today bilaterally is normal, i e  no signs of eustachian tube dysfunction or infection, aside from his symptoms  Tympanogram did reveal otitis media, but did not showed eustachian tube dysfunction  Antibiotics for otitis prescribed  Diagnoses and all orders for this visit:    Otitis of both ears  Comments:  Recommend antibiotics  Follow-up in approximately 2 weeks if needed  Orders: -     Tympanometry; Future  -     amoxicillin (AMOXIL) 500 mg capsule; Take 1 capsule (500 mg total) by mouth every 8 (eight) hours for 10 days    Dysfunction of both eustachian tubes  -     Tympanometry; Future    Elevated blood pressure reading    Major depressive disorder, single episode, severe without psychotic features ([facility] )    Other orders  -     escitalopram (LEXAPRO) 10 mg tablet; Take 1 tablet by mouth daily  -     sildenafil (REVATIO) 20 mg tablet; Take 1 tablet by mouth daily as needed          Subjective:      Patient ID: Wesly Noriega is a 50 y o  male  Pt c/o dull ache in both ears, Pt states he was seen by TS and was given a nasal spray which pt had no relief  Please see the chief complaint above  Given a nasal spray which did not seem to make a significant difference for him  He seems to be worse than left than the right    It does not appear to be clearing well either, i e  when he goes to blow his nose he does not notice any improvement in the symptoms  Denies any tooth her face pain along with this  Patient is on Lexapro since his last visit here  He mentioned that he is not really noticing a significant difference in symptoms versus before  We did discuss the possibility of increasing the medication, but he stated that he is feeling relatively okay, and has had some changes in his situation versus before  The following portions of the patient's history were reviewed and updated as appropriate: allergies, current medications, past family history, past medical history, past social history, past surgical history and problem list     Review of Systems   Constitutional: Negative  HENT:        Per HPI   Psychiatric/Behavioral: Negative for suicidal ideas  Per HPI         Objective:    Vitals:    02/01/18 1637   BP: 128/84   Pulse: 80   Weight: 105 kg (230 lb 12 8 oz)   Height: 5' 10" (1 778 m)      Physical Exam   Constitutional: He appears well-developed and well-nourished  HENT:   Head: Normocephalic and atraumatic  Right Ear: External ear normal  Tympanic membrane is not perforated, not erythematous, not retracted and not bulging  Left Ear: External ear normal  Tympanic membrane is not perforated, not erythematous, not retracted and not bulging  Neck: Normal range of motion  Neck supple     Pulmonary/Chest: Effort normal and breath sounds normal

## 2018-02-01 NOTE — ASSESSMENT & PLAN NOTE
Ear exam today bilaterally is normal, i e  no signs of eustachian tube dysfunction or infection, aside from his symptoms  Tympanogram did reveal otitis media, but did not showed eustachian tube dysfunction  Antibiotics for otitis prescribed

## 2018-03-20 PROBLEM — N52.9 ED (ERECTILE DYSFUNCTION) OF ORGANIC ORIGIN: Status: ACTIVE | Noted: 2018-03-20

## 2018-03-20 NOTE — PROGRESS NOTES
3/21/2018      Chief Complaint   Patient presents with    meds not working       Assessment and Plan    48 y o  male managed by Dr Heather Quezada    1  BPH  - patient wishes to continue holding on flomax     2  ED  - refilled Sildenafil 20mg as needed and discussed the patient can take up to 100mg  - will trial other PDE5 inhibitors if no improvement  - discussed overall healthy lifestyle   - FU as schedules      History of Present Illness  Cathie Coleman is a 48 y o  male here for follow up evaluation of BPH with LUTs and ED  Was given tamsulosin but never had it filled  Continues on Sildenafil 40mg as needed  Is working okay but not great  Review of Systems   Constitutional: Negative for activity change, chills and fever  Gastrointestinal: Negative for abdominal distention and abdominal pain  Musculoskeletal: Negative for back pain and gait problem  Psychiatric/Behavioral: Negative for behavioral problems and confusion  Urinary Incontinence Screening    Flowsheet Row Most Recent Value   Urinary Incontinence   Urinary Incontinence? Yes [little, no pads]   Incomplete emptying? No   Urinary frequency? No   Urinary urgency? No   Urinary hesitancy? Yes [sometimes]   Dysuria (painful difficult urination)? No   Nocturia (waking up to use the bathroom)? Yes [once]   Straining (having to push to go)? Yes   Weak stream?  No   Intermittent stream?  No   Post void dribbling?   Yes          Past Medical History  Past Medical History:   Diagnosis Date    Adenomatosis     BPH with obstruction/lower urinary tract symptoms     Erectile dysfunction     Eustachian tube dysfunction     External hemorrhoid     Nocturia     Obesity     Organic impotence     Serous otitis media     Sleep apnea     Testicular asymmetry        Past Social History  Past Surgical History:   Procedure Laterality Date    COLONOSCOPY       History   Smoking Status    Never Smoker   Smokeless Tobacco    Never Used       Past Family History  Family History   Problem Relation Age of Onset    Emphysema Mother        Past Social history  Social History     Social History    Marital status: Legally      Spouse name: N/A    Number of children: N/A    Years of education: N/A     Occupational History    Not on file  Social History Main Topics    Smoking status: Never Smoker    Smokeless tobacco: Never Used    Alcohol use Yes      Comment: social    Drug use: No    Sexual activity: Not on file     Other Topics Concern    Not on file     Social History Narrative    No narrative on file       Current Medications  Current Outpatient Prescriptions   Medication Sig Dispense Refill    sildenafil (REVATIO) 20 mg tablet Take 1 tablet by mouth daily as needed      escitalopram (LEXAPRO) 10 mg tablet Take 1 tablet by mouth daily       No current facility-administered medications for this visit  Allergies  No Known Allergies      The following portions of the patient's history were reviewed and updated as appropriate: allergies, current medications, past medical history, past social history, past surgical history and problem list       Vitals  Vitals:    03/21/18 0850   BP: 150/100   Pulse: 78   Weight: 105 kg (231 lb)   Height: 5' 10" (1 778 m)         Physical Exam    Constitutional   General appearance: Patient is seated and in no acute distress, well appearing and well nourished  Head and Face   Head and face: Normal     Eyes   Conjunctiva and lids: No erythema, swelling or discharge  Ears, Nose, Mouth, and Throat   Hearing: Normal     Pulmonary   Respiratory effort: No increased work of breathing or signs of respiratory distress  Cardiovascular   Examination of extremities for edema and/or varicosities: Normal     Abdomen   Abdomen: Non-tender, no masses  Musculoskeletal   Gait and station: Normal    Skin   Skin and subcutaneous tissue: Warm, dry, and intact  No visible lesions or rashes    Psychiatric Judgment and insight: Normal  Recent and remote memory:  Normal  Mood and affect: Normal        Results  No results found for this or any previous visit (from the past 1 hour(s)) ]  Lab Results   Component Value Date    PSA 0 3 12/05/2017    PSA 0 2 04/07/2016     Lab Results   Component Value Date    GLUCOSE 107 04/07/2016    CALCIUM 9 3 12/05/2017     12/05/2017    K 4 2 12/05/2017    CO2 29 12/05/2017     12/05/2017    BUN 18 12/05/2017    CREATININE 1 06 12/05/2017     Lab Results   Component Value Date    WBC 6 49 12/05/2017    HGB 15 4 12/05/2017    HCT 44 2 12/05/2017    MCV 94 12/05/2017     12/05/2017           Orders  No orders of the defined types were placed in this encounter

## 2018-03-21 ENCOUNTER — OFFICE VISIT (OUTPATIENT)
Dept: UROLOGY | Facility: AMBULATORY SURGERY CENTER | Age: 53
End: 2018-03-21
Payer: COMMERCIAL

## 2018-03-21 VITALS
WEIGHT: 231 LBS | BODY MASS INDEX: 33.07 KG/M2 | DIASTOLIC BLOOD PRESSURE: 100 MMHG | HEIGHT: 70 IN | HEART RATE: 78 BPM | SYSTOLIC BLOOD PRESSURE: 150 MMHG

## 2018-03-21 DIAGNOSIS — N13.8 BPH WITH OBSTRUCTION/LOWER URINARY TRACT SYMPTOMS: Primary | ICD-10-CM

## 2018-03-21 DIAGNOSIS — N52.9 ED (ERECTILE DYSFUNCTION) OF ORGANIC ORIGIN: ICD-10-CM

## 2018-03-21 DIAGNOSIS — N40.1 BPH WITH OBSTRUCTION/LOWER URINARY TRACT SYMPTOMS: Primary | ICD-10-CM

## 2018-03-21 PROCEDURE — 99213 OFFICE O/P EST LOW 20 MIN: CPT | Performed by: PHYSICIAN ASSISTANT

## 2018-03-21 RX ORDER — SILDENAFIL CITRATE 20 MG/1
20 TABLET ORAL AS NEEDED
Qty: 90 TABLET | Refills: 3 | Status: SHIPPED | OUTPATIENT
Start: 2018-03-21

## 2018-03-29 ENCOUNTER — OFFICE VISIT (OUTPATIENT)
Dept: FAMILY MEDICINE CLINIC | Facility: CLINIC | Age: 53
End: 2018-03-29
Payer: COMMERCIAL

## 2018-03-29 VITALS
WEIGHT: 224.6 LBS | HEART RATE: 76 BPM | HEIGHT: 70 IN | DIASTOLIC BLOOD PRESSURE: 86 MMHG | BODY MASS INDEX: 32.16 KG/M2 | SYSTOLIC BLOOD PRESSURE: 110 MMHG

## 2018-03-29 DIAGNOSIS — N52.9 ED (ERECTILE DYSFUNCTION) OF ORGANIC ORIGIN: ICD-10-CM

## 2018-03-29 DIAGNOSIS — R03.0 ELEVATED BLOOD PRESSURE READING: Primary | ICD-10-CM

## 2018-03-29 DIAGNOSIS — F32.2 MAJOR DEPRESSIVE DISORDER, SINGLE EPISODE, SEVERE WITHOUT PSYCHOTIC FEATURES (HCC): ICD-10-CM

## 2018-03-29 PROCEDURE — 99213 OFFICE O/P EST LOW 20 MIN: CPT | Performed by: FAMILY MEDICINE

## 2018-03-29 PROCEDURE — 3008F BODY MASS INDEX DOCD: CPT | Performed by: FAMILY MEDICINE

## 2018-03-29 NOTE — PROGRESS NOTES
Assessment and Plan:    Problem List Items Addressed This Visit     Major depressive disorder, single episode, severe without psychotic features [facility])     Patient is currently off Lexapro  Negative SI, positive contract  He denies any current depression symptoms  Will re-evaluate in the future  Elevated blood pressure reading - Primary     Blood pressure today is reasonable  At Urology was quite elevated  It is likely that there was some increased stress and anxiety with that  I would not make any adjustments at the moment  ED (erectile dysfunction) of organic origin     Patient did get generic sildenafil, 20 mg tablets  Based on this, he could take 2 through 5 tablets as needed  This is at the direction of urology  Diagnoses and all orders for this visit:    Elevated blood pressure reading    ED (erectile dysfunction) of organic origin    Major depressive disorder, single episode, severe without psychotic features ([facility] )              Subjective:      Patient ID: Alberto Finnegan is a 48 y o  male  CC: BP was high at Urology appt the other week  He doesn't have family hx of HTN or Heart Disease  He mentioned he is under some stress  mjs    No chief complaint on file  HPI:    BP was elevated  He has some issues now  He did have some issues with Ed, and Viagra did not help  The following portions of the patient's history were reviewed and updated as appropriate: allergies, current medications and problem list       Review of Systems   Constitutional: Negative  HENT: Negative  Cardiovascular: Negative  Data to review:       Objective:    Vitals:    03/29/18 0821   BP: 110/86   Pulse: 76   Weight: 102 kg (224 lb 9 6 oz)   Height: 5' 10" (1 778 m)        Physical Exam   Constitutional: He appears well-developed and well-nourished  Nursing note and vitals reviewed

## 2018-03-29 NOTE — PATIENT INSTRUCTIONS
Problem List Items Addressed This Visit     Major depressive disorder, single episode, severe without psychotic features [facility])     Patient is currently off Lexapro  Negative SI, positive contract  He denies any current depression symptoms  Will re-evaluate in the future  Elevated blood pressure reading - Primary     Blood pressure today is reasonable  At Urology was quite elevated  It is likely that there was some increased stress and anxiety with that  I would not make any adjustments at the moment  ED (erectile dysfunction) of organic origin     Patient did get generic sildenafil, 20 mg tablets  Based on this, he could take 2 through 5 tablets as needed  This is at the direction of urology

## 2018-03-29 NOTE — ASSESSMENT & PLAN NOTE
Blood pressure today is reasonable  At Urology was quite elevated  It is likely that there was some increased stress and anxiety with that  I would not make any adjustments at the moment

## 2018-03-29 NOTE — ASSESSMENT & PLAN NOTE
Patient did get generic sildenafil, 20 mg tablets  Based on this, he could take 2 through 5 tablets as needed  This is at the direction of urology

## 2018-03-29 NOTE — ASSESSMENT & PLAN NOTE
Patient is currently off Lexapro  Negative SI, positive contract  He denies any current depression symptoms  Will re-evaluate in the future

## 2019-05-14 ENCOUNTER — TELEPHONE (OUTPATIENT)
Dept: UROLOGY | Facility: MEDICAL CENTER | Age: 54
End: 2019-05-14

## 2020-09-15 ENCOUNTER — TELEPHONE (OUTPATIENT)
Dept: FAMILY MEDICINE CLINIC | Facility: CLINIC | Age: 55
End: 2020-09-15

## 2020-09-15 NOTE — TELEPHONE ENCOUNTER
I called and spoke to patient  He moved to [place] in 2018  He states he will no longer be back in the [place] and will not need us as his PCP

## 2020-09-22 NOTE — TELEPHONE ENCOUNTER
09/22/20 8:05 AM     Thank you for your request  Your request has been received, reviewed, and the patient chart updated  The PCP has successfully been removed with a patient attribution note  This message will now be completed      Thank you  Aric Meadows

## 2025-05-20 ENCOUNTER — TELEPHONE (OUTPATIENT)
Age: 60
End: 2025-05-20

## 2025-05-20 NOTE — TELEPHONE ENCOUNTER
Patient called to check if there is any immunization record in his chart from when he was a previous patient of the practice. Reviewed chart and advised patient no immunization history on file.